# Patient Record
Sex: MALE | Race: OTHER | Employment: FULL TIME | URBAN - METROPOLITAN AREA
[De-identification: names, ages, dates, MRNs, and addresses within clinical notes are randomized per-mention and may not be internally consistent; named-entity substitution may affect disease eponyms.]

---

## 2017-07-26 ENCOUNTER — OFFICE VISIT (OUTPATIENT)
Dept: FAMILY MEDICINE CLINIC | Facility: CLINIC | Age: 37
End: 2017-07-26

## 2017-07-26 VITALS
BODY MASS INDEX: 26.81 KG/M2 | SYSTOLIC BLOOD PRESSURE: 106 MMHG | DIASTOLIC BLOOD PRESSURE: 60 MMHG | HEART RATE: 62 BPM | WEIGHT: 181 LBS | TEMPERATURE: 98 F | OXYGEN SATURATION: 97 % | HEIGHT: 69 IN | RESPIRATION RATE: 14 BRPM

## 2017-07-26 DIAGNOSIS — A08.4 VIRAL GASTROENTERITIS: Primary | ICD-10-CM

## 2017-07-26 PROCEDURE — 99202 OFFICE O/P NEW SF 15 MIN: CPT | Performed by: PHYSICIAN ASSISTANT

## 2017-07-26 NOTE — PROGRESS NOTES
CHIEF COMPLAINT:   Patient presents with:  Vomiting: diarrhea, started during the night, cramping        HPI:   Nasrin Kimbrough is a 40year old male who presents for complaints of GI symptoms.   Patient reports he has had 3 episodes of diarrhea, two episo GI: No visible scars or masses. BS's present x4. No palpable masses or hepatosplenomegaly. No tenderness upon palpation x  Q. Lag[place]'s sign negative. No splenic TTP.     EXTREMITIES: no cyanosis, clubbing or edema    ASSESSMENT AND PLAN:     ASSESSMENT: Gastroenteritis is commonly called the stomach flu. It is most often caused by a virus that affects the stomach and intestinal tract and usually lasts from 2 to 7 days. Common viruses causing gastroenteritis include norovirus, rotavirus, and hepatitis A.  Debara Bosworth You may use acetaminophen or NSAID medicines like ibuprofen or naproxen to control fever unless another medicine was given. If you have chronic liver or kidney disease, talk with your healthcare provider before using these medicines.  Also talk with your pr · Limit fat intake to less than 15 grams per day. Do this by avoiding margarine, butter, oils, mayonnaise, sauces, gravies, fried foods, peanut butter, meat, poultry, and fish.   · Limit fiber and avoid raw or cooked vegetables, fresh fruits (except bananas

## 2017-07-26 NOTE — PATIENT INSTRUCTIONS
Take small amounts of clear fluids frequently, sips every 15 minutes as tolerated  Advance diet slowly as tolerated, start with small amounts of bland foods  Follow BRAT diet till symptoms resolved, usually in 2-3 days, which includes bananas, rice, apples Gastroenteritis is transmitted by contact with the stool or vomit of an infected person. This can occur from person to person or from contact with a contaminated surface.   Follow these guidelines when caring for yourself at home:  · If symptoms are severe, · If you eat, avoid fatty, greasy, spicy, or fried foods. · Don't eat dairy if you have diarrhea. This can make diarrhea worse. · Avoid tobacco, alcohol, and caffeine which may worsen symptoms.   During the first 24 hours (the first full day), follow the Call 911 if any of these occur:  · Trouble breathing  · Chest pain  · Confused  · Severe drowsiness or trouble awakening  · Fainting or loss of consciousness  · Rapid heart rate  · Seizure  · Stiff neck  When to seek medical advice  Call your healthcare pr

## 2017-10-06 ENCOUNTER — OFFICE VISIT (OUTPATIENT)
Dept: FAMILY MEDICINE CLINIC | Facility: CLINIC | Age: 37
End: 2017-10-06

## 2017-10-06 VITALS
OXYGEN SATURATION: 98 % | DIASTOLIC BLOOD PRESSURE: 76 MMHG | WEIGHT: 185 LBS | HEART RATE: 58 BPM | RESPIRATION RATE: 16 BRPM | SYSTOLIC BLOOD PRESSURE: 132 MMHG | TEMPERATURE: 98 F | BODY MASS INDEX: 27 KG/M2

## 2017-10-06 DIAGNOSIS — K29.00 ACUTE GASTRITIS WITHOUT HEMORRHAGE, UNSPECIFIED GASTRITIS TYPE: Primary | ICD-10-CM

## 2017-10-06 PROCEDURE — 99212 OFFICE O/P EST SF 10 MIN: CPT | Performed by: NURSE PRACTITIONER

## 2017-10-06 RX ORDER — CIPROFLOXACIN 500 MG/1
500 TABLET, FILM COATED ORAL 2 TIMES DAILY
Qty: 6 TABLET | Refills: 0 | Status: SHIPPED | OUTPATIENT
Start: 2017-10-06 | End: 2017-10-09

## 2017-10-06 NOTE — PROGRESS NOTES
CHIEF COMPLAINT:   Patient presents with:  Abdominal Pain: diarrhea, returned from [facility] 2 days ago      HPI:   Joesph Ceja is a 40year old male who presents for complaints of abdominal pain and diarrhea. Symptoms have been present for 4  days.   He GI: No visible scars or masses. BS's present x4. No palpable masses or hepatosplenomegaly. No tenderness upon palpation. No rebound tenderness.     ASSESSMENT AND PLAN:   ASSESSMENT:  Acute gastritis without hemorrhage, unspecified gastritis type  (primar · Aspirin. Avoid taking aspirin and other anti-inflammatory medicines, such as ibuprofen. They can irritate your stomach lining. Also, check with your healthcare provider before taking or stopping any medicines. · Spicy foods and caffeine.  Stay away from

## 2017-10-06 NOTE — PATIENT INSTRUCTIONS
-eat a bland diet (bananas, rice, apples, toast, etc)  -start antibiotic tomorrow afternoon if no improvement  -Take antibiotics with food and plenty of water. Eat yogurt or take probiotic daily.   Align is a good otc probiotic.  -follow up with primary Stress may make your gastritis symptoms worse. Whenever you can, reduce the stress in your life. One way to do this is to start an exercise program—talk to your healthcare provider first. Also, try to get enough sleep, at least 8 hours a night.   Date Last

## (undated) NOTE — LETTER
Date: 7/26/2017      Patient Name: Bonnie Garcias          To Whom it may concern: The above patient was seen at the [facility] for treatment of a medical condition.     This patient should be excused from attending work tomorrow, July 27,